# Patient Record
Sex: FEMALE | Race: WHITE | NOT HISPANIC OR LATINO | Employment: UNEMPLOYED | URBAN - METROPOLITAN AREA
[De-identification: names, ages, dates, MRNs, and addresses within clinical notes are randomized per-mention and may not be internally consistent; named-entity substitution may affect disease eponyms.]

---

## 2023-08-02 ENCOUNTER — APPOINTMENT (OUTPATIENT)
Dept: PRIMARY CARE | Facility: CLINIC | Age: 48
End: 2023-08-02
Payer: COMMERCIAL

## 2023-08-09 PROBLEM — J30.1 SEASONAL ALLERGIC RHINITIS DUE TO POLLEN: Status: ACTIVE | Noted: 2023-08-09

## 2023-08-09 PROBLEM — L40.4 PSORIASIS, GUTTATE: Status: ACTIVE | Noted: 2023-08-09

## 2023-08-09 RX ORDER — NYSTATIN 100000 [USP'U]/G
POWDER TOPICAL
COMMUNITY
Start: 2016-11-15 | End: 2023-08-10 | Stop reason: SDUPTHER

## 2023-08-09 RX ORDER — LEVONORGESTREL 52 MG/1
INTRAUTERINE DEVICE INTRAUTERINE
COMMUNITY

## 2023-08-09 RX ORDER — TRETINOIN 0.5 MG/G
CREAM TOPICAL
COMMUNITY
Start: 2021-05-24

## 2023-08-10 ENCOUNTER — LAB (OUTPATIENT)
Dept: LAB | Facility: LAB | Age: 48
End: 2023-08-10
Payer: COMMERCIAL

## 2023-08-10 ENCOUNTER — OFFICE VISIT (OUTPATIENT)
Dept: PRIMARY CARE | Facility: CLINIC | Age: 48
End: 2023-08-10
Payer: COMMERCIAL

## 2023-08-10 VITALS
OXYGEN SATURATION: 97 % | HEART RATE: 65 BPM | DIASTOLIC BLOOD PRESSURE: 83 MMHG | BODY MASS INDEX: 28.6 KG/M2 | WEIGHT: 182.2 LBS | HEIGHT: 67 IN | SYSTOLIC BLOOD PRESSURE: 132 MMHG

## 2023-08-10 DIAGNOSIS — L30.4 INTERTRIGO: ICD-10-CM

## 2023-08-10 DIAGNOSIS — J30.1 SEASONAL ALLERGIC RHINITIS DUE TO POLLEN: ICD-10-CM

## 2023-08-10 DIAGNOSIS — Z12.12 SCREENING FOR RECTAL CANCER: ICD-10-CM

## 2023-08-10 DIAGNOSIS — Z01.419 WELL WOMAN EXAM: ICD-10-CM

## 2023-08-10 DIAGNOSIS — Z00.00 HEALTHCARE MAINTENANCE: ICD-10-CM

## 2023-08-10 DIAGNOSIS — E03.9 HYPOTHYROIDISM, UNSPECIFIED TYPE: ICD-10-CM

## 2023-08-10 DIAGNOSIS — Z13.6 SCREENING FOR CARDIOVASCULAR CONDITION: ICD-10-CM

## 2023-08-10 DIAGNOSIS — Z00.00 HEALTHCARE MAINTENANCE: Primary | ICD-10-CM

## 2023-08-10 DIAGNOSIS — Z12.31 ENCOUNTER FOR SCREENING MAMMOGRAM FOR MALIGNANT NEOPLASM OF BREAST: ICD-10-CM

## 2023-08-10 DIAGNOSIS — Z12.11 SCREEN FOR COLON CANCER: ICD-10-CM

## 2023-08-10 PROBLEM — L40.4 PSORIASIS, GUTTATE: Status: RESOLVED | Noted: 2023-08-09 | Resolved: 2023-08-10

## 2023-08-10 LAB
ALANINE AMINOTRANSFERASE (SGPT) (U/L) IN SER/PLAS: 15 U/L (ref 7–45)
ALBUMIN (G/DL) IN SER/PLAS: 4.6 G/DL (ref 3.4–5)
ALKALINE PHOSPHATASE (U/L) IN SER/PLAS: 75 U/L (ref 33–110)
ANION GAP IN SER/PLAS: 11 MMOL/L (ref 10–20)
ASPARTATE AMINOTRANSFERASE (SGOT) (U/L) IN SER/PLAS: 17 U/L (ref 9–39)
BILIRUBIN TOTAL (MG/DL) IN SER/PLAS: 0.5 MG/DL (ref 0–1.2)
CALCIUM (MG/DL) IN SER/PLAS: 10.1 MG/DL (ref 8.6–10.6)
CARBON DIOXIDE, TOTAL (MMOL/L) IN SER/PLAS: 28 MMOL/L (ref 21–32)
CHLORIDE (MMOL/L) IN SER/PLAS: 108 MMOL/L (ref 98–107)
CHOLESTEROL (MG/DL) IN SER/PLAS: 212 MG/DL (ref 0–199)
CHOLESTEROL IN HDL (MG/DL) IN SER/PLAS: 94.8 MG/DL
CHOLESTEROL/HDL RATIO: 2.2
CREATININE (MG/DL) IN SER/PLAS: 0.92 MG/DL (ref 0.5–1.05)
ERYTHROCYTE DISTRIBUTION WIDTH (RATIO) BY AUTOMATED COUNT: 12.2 % (ref 11.5–14.5)
ERYTHROCYTE MEAN CORPUSCULAR HEMOGLOBIN CONCENTRATION (G/DL) BY AUTOMATED: 31.5 G/DL (ref 32–36)
ERYTHROCYTE MEAN CORPUSCULAR VOLUME (FL) BY AUTOMATED COUNT: 95 FL (ref 80–100)
ERYTHROCYTES (10*6/UL) IN BLOOD BY AUTOMATED COUNT: 4.32 X10E12/L (ref 4–5.2)
GFR FEMALE: 76 ML/MIN/1.73M2
GLUCOSE (MG/DL) IN SER/PLAS: 85 MG/DL (ref 74–99)
HEMATOCRIT (%) IN BLOOD BY AUTOMATED COUNT: 40.9 % (ref 36–46)
HEMOGLOBIN (G/DL) IN BLOOD: 12.9 G/DL (ref 12–16)
LDL: 98 MG/DL (ref 0–99)
LEUKOCYTES (10*3/UL) IN BLOOD BY AUTOMATED COUNT: 4.3 X10E9/L (ref 4.4–11.3)
NRBC (PER 100 WBCS) BY AUTOMATED COUNT: 0 /100 WBC (ref 0–0)
PLATELETS (10*3/UL) IN BLOOD AUTOMATED COUNT: 240 X10E9/L (ref 150–450)
POTASSIUM (MMOL/L) IN SER/PLAS: 4.8 MMOL/L (ref 3.5–5.3)
PROTEIN TOTAL: 7.1 G/DL (ref 6.4–8.2)
SODIUM (MMOL/L) IN SER/PLAS: 142 MMOL/L (ref 136–145)
THYROXINE (T4) FREE (NG/DL) IN SER/PLAS: 1.07 NG/DL (ref 0.78–1.48)
TRIGLYCERIDE (MG/DL) IN SER/PLAS: 94 MG/DL (ref 0–149)
UREA NITROGEN (MG/DL) IN SER/PLAS: 11 MG/DL (ref 6–23)
VLDL: 19 MG/DL (ref 0–40)

## 2023-08-10 PROCEDURE — 80061 LIPID PANEL: CPT

## 2023-08-10 PROCEDURE — 99396 PREV VISIT EST AGE 40-64: CPT | Performed by: INTERNAL MEDICINE

## 2023-08-10 PROCEDURE — 84439 ASSAY OF FREE THYROXINE: CPT

## 2023-08-10 PROCEDURE — 80053 COMPREHEN METABOLIC PANEL: CPT

## 2023-08-10 PROCEDURE — 36415 COLL VENOUS BLD VENIPUNCTURE: CPT

## 2023-08-10 PROCEDURE — 85027 COMPLETE CBC AUTOMATED: CPT

## 2023-08-10 PROCEDURE — 1036F TOBACCO NON-USER: CPT | Performed by: INTERNAL MEDICINE

## 2023-08-10 RX ORDER — NYSTATIN 100000 [USP'U]/G
POWDER TOPICAL
Qty: 9 G | Refills: 3 | Status: SHIPPED | OUTPATIENT
Start: 2023-08-10

## 2023-08-10 ASSESSMENT — LIFESTYLE VARIABLES
HOW OFTEN DO YOU HAVE A DRINK CONTAINING ALCOHOL: MONTHLY OR LESS
SKIP TO QUESTIONS 9-10: 0
HOW MANY STANDARD DRINKS CONTAINING ALCOHOL DO YOU HAVE ON A TYPICAL DAY: 1 OR 2
HOW OFTEN DO YOU HAVE SIX OR MORE DRINKS ON ONE OCCASION: LESS THAN MONTHLY
AUDIT-C TOTAL SCORE: 2

## 2023-08-10 ASSESSMENT — PATIENT HEALTH QUESTIONNAIRE - PHQ9
SUM OF ALL RESPONSES TO PHQ9 QUESTIONS 1 AND 2: 0
1. LITTLE INTEREST OR PLEASURE IN DOING THINGS: NOT AT ALL
2. FEELING DOWN, DEPRESSED OR HOPELESS: NOT AT ALL

## 2023-08-10 ASSESSMENT — ENCOUNTER SYMPTOMS
DEPRESSION: 0
LOSS OF SENSATION IN FEET: 0
OCCASIONAL FEELINGS OF UNSTEADINESS: 0
CONSTITUTIONAL NEGATIVE: 1

## 2023-08-10 NOTE — PROGRESS NOTES
"Subjective   Patient ID: Lina Johnson is a 48 y.o. female who presents for Annual Exam (PHYSICAL ).  HPI  Thinks going through menopause . Hot flashes.  Periods  noticed got better with diet cleanse.     Gyn  retired. Needs  refill.           Review of Systems   Constitutional: Negative.    All other systems reviewed and are negative.      Objective     BP Readings from Last 3 Encounters:   08/10/23 132/83   08/08/22 120/72   04/29/21 138/80      Wt Readings from Last 3 Encounters:   08/10/23 82.6 kg (182 lb 3.2 oz)   08/08/22 82.6 kg (182 lb)   04/29/21 80.7 kg (178 lb)      BMI: Estimated body mass index is 28.54 kg/m² as calculated from the following:    Height as of this encounter: 1.702 m (5' 7\").    Weight as of this encounter: 82.6 kg (182 lb 3.2 oz).    BSA: Estimated body surface area is 1.98 meters squared as calculated from the following:    Height as of this encounter: 1.702 m (5' 7\").    Weight as of this encounter: 82.6 kg (182 lb 3.2 oz).  Physical Exam  Vitals and nursing note reviewed.   Constitutional:       Appearance: Normal appearance.   HENT:      Head: Normocephalic and atraumatic.      Nose: Nose normal.   Eyes:      Conjunctiva/sclera: Conjunctivae normal.   Cardiovascular:      Rate and Rhythm: Normal rate and regular rhythm.   Pulmonary:      Effort: Pulmonary effort is normal.   Skin:     General: Skin is dry.   Neurological:      General: No focal deficit present.      Mental Status: She is alert and oriented to person, place, and time. Mental status is at baseline.   Psychiatric:         Mood and Affect: Mood normal.         Behavior: Behavior normal.         Assessment/Plan   Problem List Items Addressed This Visit          Medium    Seasonal allergic rhinitis due to pollen     Other Visit Diagnoses       Healthcare maintenance    -  Primary    Relevant Orders    Comprehensive Metabolic Panel    CBC    Lipid Panel    Thyroxine, Free    Screening for cardiovascular condition        " Relevant Orders    Lipid Panel    Hypothyroidism, unspecified type        Relevant Orders    Thyroxine, Free    Encounter for screening mammogram for malignant neoplasm of breast        Relevant Orders    BI mammo bilateral screening tomosynthesis    Well woman exam        Relevant Orders    Referral to Gynecology    Screen for colon cancer        Relevant Orders    Cologuard® colon cancer screening    Screening for rectal cancer        Relevant Orders    Cologuard® colon cancer screening    Intertrigo        Relevant Medications    nystatin (Mycostatin) 100,000 unit/gram powder

## 2023-08-28 ENCOUNTER — HOSPITAL ENCOUNTER (OUTPATIENT)
Dept: DATA CONVERSION | Facility: HOSPITAL | Age: 48
Discharge: HOME | End: 2023-08-28
Payer: COMMERCIAL

## 2023-08-28 DIAGNOSIS — Z12.31 ENCOUNTER FOR SCREENING MAMMOGRAM FOR MALIGNANT NEOPLASM OF BREAST: ICD-10-CM

## 2024-01-16 ENCOUNTER — TELEMEDICINE (OUTPATIENT)
Dept: PRIMARY CARE | Facility: CLINIC | Age: 49
End: 2024-01-16
Payer: COMMERCIAL

## 2024-01-16 DIAGNOSIS — B96.89 BACTERIAL SINUSITIS: Primary | ICD-10-CM

## 2024-01-16 DIAGNOSIS — J32.9 BACTERIAL SINUSITIS: Primary | ICD-10-CM

## 2024-01-16 PROCEDURE — 99212 OFFICE O/P EST SF 10 MIN: CPT | Performed by: NURSE PRACTITIONER

## 2024-01-16 RX ORDER — AZITHROMYCIN 250 MG/1
TABLET, FILM COATED ORAL
Qty: 6 TABLET | Refills: 0 | Status: SHIPPED | OUTPATIENT
Start: 2024-01-16 | End: 2024-01-21

## 2024-01-16 ASSESSMENT — ENCOUNTER SYMPTOMS
SINUS COMPLAINT: 1
HEADACHES: 1
SINUS PRESSURE: 1
CHILLS: 1
SHORTNESS OF BREATH: 0

## 2024-01-16 ASSESSMENT — LIFESTYLE VARIABLES: HISTORY_OF_SMOKING: I HAVE NEVER SMOKED

## 2024-01-16 NOTE — PATIENT INSTRUCTIONS
A prescription was sent to your pharmacy. Your pharmacist can answer any questions or concerns you may have or you may call the office.    Treatment  Tylenol for aches and pains, fever  Warm salt water gargles for throat discomfort  Lots of Fluids such as tea with honey, soup, broth, water, Electrolyte replacement drinks  Rest      Preventing Infection    Wash your hands. Wash your hands thoroughly and often with soap and water for at least 20 seconds. If soap and water aren't available, use an alcohol-based hand  that contains at least 60% alcohol. Teach your children the importance of hand-washing. Avoid touching your eyes, nose or mouth with unwashed hands.    Disinfect your stuff. Clean and disinfect high-touch surfaces, such as doorknobs, light switches, electronics, and kitchen and bathroom countertops daily. This is especially important when someone in your family has a cold. Wash children's toys periodically.    Cover your cough. Sneeze and cough into tissues. Throw away used tissues right away, then wash your hands thoroughly. If you don't have a tissue, sneeze or cough into the bend of your elbow and then wash your hands.    Don't share. Don't share drinking glasses or eating utensils with other family members. Use your own glass or disposable cups when you or someone else is sick. Label the cup or glass with the name of the person using it.    Stay away from people with colds. Avoid close contact with anyone who has a cold. Stay out of crowds, when possible. Avoid touching your eyes, nose and mouth.  Review your  center's policies. Look for a  setting with good hygiene practices and clear policies about keeping sick children at home.    Take care of yourself. Eating well and getting exercise and enough sleep is good for your overall health.

## 2024-01-16 NOTE — PROGRESS NOTES
Subjective   Patient ID: Lina Johnson is a 48 y.o. female who presents for a Virtual Visit bacterial sinus.  Sinus Problem  This is a new (Spouse came home from business trip with cold symptoms, treated with antibiotic, now spouse with symptoms) problem. The current episode started 1 to 4 weeks ago. The problem has been gradually worsening (worsening sinus pain, PMH bacterial sinus infections, feels same) since onset. There has been no fever. She is experiencing no pain. Associated symptoms include chills, congestion, ear pain, headaches and sinus pressure. Pertinent negatives include no shortness of breath. Past treatments include acetaminophen (honey, lemon). The treatment provided no relief.   Leaving Friday to go out of states ([place])  Home Covid test negative    Review of Systems   Constitutional:  Positive for chills.   HENT:  Positive for congestion, ear pain and sinus pressure.    Respiratory:  Negative for shortness of breath.    Neurological:  Positive for headaches.       Physical Exam not performed  E-visit questionnaire reviewed and discussed with patient.   All questions answered    Assessment/Plan   Problem List Items Addressed This Visit    None  Visit Diagnoses         Codes    Bacterial sinusitis    -  Primary J32.9, B96.89    Symptom management  Tylenol, Sudafed prn  Increase fluids     Relevant Medications    azithromycin (Zithromax) 250 mg tablet          Discussed with patient   Verbalized understanding, Teach back utilized  Recommend follow up with PCP in   week

## 2024-07-01 ENCOUNTER — TELEPHONE (OUTPATIENT)
Dept: PRIMARY CARE | Facility: CLINIC | Age: 49
End: 2024-07-01

## 2024-11-11 PROBLEM — J32.9 SINUSITIS: Status: ACTIVE | Noted: 2024-11-11

## 2024-11-11 PROBLEM — L94.0 LOCALIZED MORPHEA: Status: ACTIVE | Noted: 2024-11-11

## 2024-11-13 ENCOUNTER — OFFICE VISIT (OUTPATIENT)
Dept: PRIMARY CARE | Facility: CLINIC | Age: 49
End: 2024-11-13
Payer: COMMERCIAL

## 2024-11-13 ENCOUNTER — APPOINTMENT (OUTPATIENT)
Dept: PRIMARY CARE | Facility: CLINIC | Age: 49
End: 2024-11-13
Payer: COMMERCIAL

## 2024-11-13 VITALS
HEART RATE: 71 BPM | WEIGHT: 185 LBS | OXYGEN SATURATION: 99 % | DIASTOLIC BLOOD PRESSURE: 100 MMHG | SYSTOLIC BLOOD PRESSURE: 134 MMHG | HEIGHT: 67 IN | TEMPERATURE: 97 F | BODY MASS INDEX: 29.03 KG/M2

## 2024-11-13 DIAGNOSIS — Z00.00 HEALTH MAINTENANCE EXAMINATION: Primary | ICD-10-CM

## 2024-11-13 DIAGNOSIS — Z12.31 ENCOUNTER FOR SCREENING MAMMOGRAM FOR MALIGNANT NEOPLASM OF BREAST: ICD-10-CM

## 2024-11-13 DIAGNOSIS — R03.0 ELEVATED BLOOD PRESSURE READING WITHOUT DIAGNOSIS OF HYPERTENSION: ICD-10-CM

## 2024-11-13 DIAGNOSIS — Z12.11 SCREEN FOR COLON CANCER: ICD-10-CM

## 2024-11-13 PROBLEM — J32.9 SINUSITIS: Status: RESOLVED | Noted: 2024-11-11 | Resolved: 2024-11-13

## 2024-11-13 PROBLEM — C18.9 MALIGNANT NEOPLASM OF COLON: Status: RESOLVED | Noted: 2024-11-11 | Resolved: 2024-11-13

## 2024-11-13 LAB
ALBUMIN SERPL BCP-MCNC: 4.6 G/DL (ref 3.4–5)
ALP SERPL-CCNC: 73 U/L (ref 33–110)
ALT SERPL W P-5'-P-CCNC: 18 U/L (ref 7–45)
ANION GAP SERPL CALC-SCNC: 12 MMOL/L (ref 10–20)
AST SERPL W P-5'-P-CCNC: 22 U/L (ref 9–39)
BILIRUB SERPL-MCNC: 0.4 MG/DL (ref 0–1.2)
BUN SERPL-MCNC: 9 MG/DL (ref 6–23)
CALCIUM SERPL-MCNC: 9.4 MG/DL (ref 8.6–10.6)
CHLORIDE SERPL-SCNC: 108 MMOL/L (ref 98–107)
CHOLEST SERPL-MCNC: 248 MG/DL (ref 0–199)
CHOLESTEROL/HDL RATIO: 2.6
CO2 SERPL-SCNC: 25 MMOL/L (ref 21–32)
CREAT SERPL-MCNC: 0.8 MG/DL (ref 0.5–1.05)
EGFRCR SERPLBLD CKD-EPI 2021: 89 ML/MIN/1.73M*2
ERYTHROCYTE [DISTWIDTH] IN BLOOD BY AUTOMATED COUNT: 11.9 % (ref 11.5–14.5)
EST. AVERAGE GLUCOSE BLD GHB EST-MCNC: 100 MG/DL
GLUCOSE SERPL-MCNC: 96 MG/DL (ref 74–99)
HBA1C MFR BLD: 5.1 %
HCT VFR BLD AUTO: 43.8 % (ref 36–46)
HDLC SERPL-MCNC: 96.8 MG/DL
HGB BLD-MCNC: 14.2 G/DL (ref 12–16)
LDLC SERPL CALC-MCNC: 135 MG/DL
MCH RBC QN AUTO: 29.3 PG (ref 26–34)
MCHC RBC AUTO-ENTMCNC: 32.4 G/DL (ref 32–36)
MCV RBC AUTO: 91 FL (ref 80–100)
NON HDL CHOLESTEROL: 151 MG/DL (ref 0–149)
NRBC BLD-RTO: 0 /100 WBCS (ref 0–0)
PLATELET # BLD AUTO: 236 X10*3/UL (ref 150–450)
POTASSIUM SERPL-SCNC: 4.6 MMOL/L (ref 3.5–5.3)
PROT SERPL-MCNC: 7 G/DL (ref 6.4–8.2)
RBC # BLD AUTO: 4.84 X10*6/UL (ref 4–5.2)
SODIUM SERPL-SCNC: 140 MMOL/L (ref 136–145)
TRIGL SERPL-MCNC: 79 MG/DL (ref 0–149)
TSH SERPL-ACNC: 2.28 MIU/L (ref 0.44–3.98)
VLDL: 16 MG/DL (ref 0–40)
WBC # BLD AUTO: 4.6 X10*3/UL (ref 4.4–11.3)

## 2024-11-13 PROCEDURE — 80053 COMPREHEN METABOLIC PANEL: CPT

## 2024-11-13 PROCEDURE — 83036 HEMOGLOBIN GLYCOSYLATED A1C: CPT

## 2024-11-13 PROCEDURE — 84443 ASSAY THYROID STIM HORMONE: CPT

## 2024-11-13 PROCEDURE — 80061 LIPID PANEL: CPT

## 2024-11-13 PROCEDURE — 3008F BODY MASS INDEX DOCD: CPT | Performed by: FAMILY MEDICINE

## 2024-11-13 PROCEDURE — 99396 PREV VISIT EST AGE 40-64: CPT | Performed by: FAMILY MEDICINE

## 2024-11-13 PROCEDURE — 85027 COMPLETE CBC AUTOMATED: CPT

## 2024-11-13 PROCEDURE — 1036F TOBACCO NON-USER: CPT | Performed by: FAMILY MEDICINE

## 2024-11-13 ASSESSMENT — PATIENT HEALTH QUESTIONNAIRE - PHQ9
2. FEELING DOWN, DEPRESSED OR HOPELESS: NOT AT ALL
2. FEELING DOWN, DEPRESSED OR HOPELESS: NOT AT ALL
SUM OF ALL RESPONSES TO PHQ9 QUESTIONS 1 AND 2: 0
1. LITTLE INTEREST OR PLEASURE IN DOING THINGS: NOT AT ALL
SUM OF ALL RESPONSES TO PHQ9 QUESTIONS 1 AND 2: 0
1. LITTLE INTEREST OR PLEASURE IN DOING THINGS: NOT AT ALL

## 2024-11-13 ASSESSMENT — ENCOUNTER SYMPTOMS
OCCASIONAL FEELINGS OF UNSTEADINESS: 0
LOSS OF SENSATION IN FEET: 0
DEPRESSION: 0

## 2024-11-13 NOTE — ASSESSMENT & PLAN NOTE
Recommended screening guidelines addressed and orders placed as indicated by age and chronic conditions  Screening labs ordered, will call with results  Mammogram ordered, cologuard  Continue to work on healthy lifestyle including well balanced diet, regular activity, limit alcohol, no tobacco products   Follow up annually

## 2024-11-13 NOTE — ASSESSMENT & PLAN NOTE
Lifestyle modification discussed at length with focus on improving diet and increasing activity levels  We did discuss medication use to help with metabolic changes post menopause  Does have elevated blood pressure in office and we'll keep an eye on this as it is a comorbidity

## 2024-11-13 NOTE — ASSESSMENT & PLAN NOTE
Monitor blood pressure closely  Previously normal therefore unclear if this is situational or related to some of the recent weight and metabolic changes

## 2024-11-13 NOTE — PROGRESS NOTES
Reason for Visit: Annual Physical Exam    HPI:  Presents to establish and discuss menopause and weight gain  Frustrated with some of the central weight that she has gained, interested in considering GLP1    Active Problem List  Patient Active Problem List   Diagnosis    Allergic rhinitis due to pollen    Localized morphea    Melanocytic nevi of trunk    Melanocytic nevi of unspecified lower limb, including hip    Melanocytic nevi of unspecified upper limb, including shoulder    Melanocytic nevi of other parts of face    Skin changes due to chronic exposure to nonionizing radiation, unspecified    Health maintenance examination    Elevated blood pressure reading without diagnosis of hypertension    BMI 28.0-28.9,adult       Comprehensive Medical/Surgical/Social/Family History  Past Medical History:   Diagnosis Date    Acute bronchitis, unspecified 01/16/2017    Acute tracheobronchitis    Acute bronchitis, unspecified 01/17/2018    Acute bronchitis due to infection    Acute maxillary sinusitis, unspecified 06/04/2019    Acute maxillary sinusitis, unspecified    Acute pharyngitis, unspecified 05/25/2016    Sore throat    Acute sinusitis, unspecified 06/05/2018    Acute non-recurrent sinusitis, unspecified location    Candidiasis of skin and nail 11/15/2016    Candidal intertrigo    Cough, unspecified 11/15/2016    Coughing    Decreased white blood cell count, unspecified 05/25/2016    Leukopenia, unspecified type    Displaced fracture of distal phalanx of left little finger, initial encounter for closed fracture 11/02/2015    Closed displaced fracture of distal phalanx of left little finger, initial encounter    Encounter for health counseling related to travel 06/05/2018    Counseling about travel    Encounter for immunization 10/08/2020    Influenza vaccination administered at current visit    Encounter for screening for infectious and parasitic diseases, unspecified 06/05/2018    Encounter for screening for  infectious or parasitic diseases    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, guttate    Other specified cough 05/25/2016    Productive cough    Otitis media, unspecified, bilateral 06/05/2018    Acute bilateral otitis media    Personal history of other (healed) physical injury and trauma 03/25/2021    History of injury    Personal history of other diseases of the respiratory system 03/22/2022    History of sinusitis    Personal history of other diseases of the respiratory system 05/25/2016    History of acute sinusitis    Personal history of other drug therapy 11/15/2016    History of influenza vaccination    Personal history of other infectious and parasitic diseases 05/25/2016    History of viral infection    Personal history of other specified conditions 11/15/2016    History of weight gain    Personal history of other specified conditions 12/11/2018    History of motion sickness    Sprain of other specified parts of left knee, initial encounter 04/27/2021    Sprain of other ligament of left knee, initial encounter    Sprain of unspecified site of left knee, subsequent encounter 04/27/2021    Sprain of left knee, unspecified ligament, subsequent encounter    Torticollis 11/15/2016    Acute muscle stiffness of neck    Unspecified acute conjunctivitis, unspecified eye 06/26/2020    Acute bacterial conjunctivitis, unspecified laterality    Whooping cough, unspecified species without pneumonia 01/16/2017    Pertussis-like syndrome     History reviewed. No pertinent surgical history.  Social History     Tobacco Use    Smoking status: Never    Smokeless tobacco: Never   Substance Use Topics    Alcohol use: Yes     Comment: social    Drug use: Never     No family history on file.      Allergies and Medications  Patient has no known allergies.  Current Outpatient Medications on File Prior to Visit   Medication Sig Dispense Refill    Lactobacillus acidophilus (PROBIOTIC ORAL) Take by mouth.      tretinoin (Retin-A) 0.05  "% cream APPLY PEA SIZED AMOUNT TO FULL FACE AT BEDTIME      nystatin (Mycostatin) 100,000 unit/gram powder apply to affected area bid as directed (Patient not taking: Reported on 11/13/2024) 9 g 3    [DISCONTINUED] levonorgestrel (Mirena) 21 mcg/24 hours (8 yrs) 52 mg IUD by intrauterine route.       No current facility-administered medications on file prior to visit.         ROS otherwise negative aside from what was mentioned above in HPI.    Vitals  BP (!) 134/100 (BP Location: Left arm, Patient Position: Sitting, BP Cuff Size: Adult)   Pulse 71   Temp 36.1 °C (97 °F) (Temporal)   Ht 1.702 m (5' 7\")   Wt 83.9 kg (185 lb)   SpO2 99%   BMI 28.98 kg/m²   Body mass index is 28.98 kg/m².  Physical Exam  Gen: Alert, NAD  HEENT:  PERRL, EOMI, conjunctiva and sclera normal in appearance. External auditory canals/TMs normal; Oral cavity and posterior pharynx without lesions/exudate  Neck:  Supple with FROM; No masses/nodes palpable; Thyroid nontender and without nodules; No NAOMIE  Respiratory:  Lungs CTAB  Cardiovascular:  Heart RRR. No M/R/G. Peripheral pulses equal bilaterally  Abdomen:  Soft, nontender, No R/G/R; No HSM or masses palpated  Extremities:  FROM all extremities; Muscle strength grossly normal with good tone  Neuro:  CN II-XII intact; Gross motor and sensory intact  Skin:  No suspicious lesions present    Assessment and Plan:  Problem List Items Addressed This Visit       Health maintenance examination - Primary    Current Assessment & Plan     Recommended screening guidelines addressed and orders placed as indicated by age and chronic conditions  Screening labs ordered, will call with results  Mammogram ordered, cologuard  Continue to work on healthy lifestyle including well balanced diet, regular activity, limit alcohol, no tobacco products   Follow up annually           Relevant Orders    TSH with reflex to Free T4 if abnormal    Hemoglobin A1C    Comprehensive Metabolic Panel    CBC    Lipid Panel "    Referral to Gynecology    Elevated blood pressure reading without diagnosis of hypertension    Current Assessment & Plan     Monitor blood pressure closely  Previously normal therefore unclear if this is situational or related to some of the recent weight and metabolic changes         BMI 28.0-28.9,adult    Current Assessment & Plan     Lifestyle modification discussed at length with focus on improving diet and increasing activity levels  We did discuss medication use to help with metabolic changes post menopause  Does have elevated blood pressure in office and we'll keep an eye on this as it is a comorbidity          Other Visit Diagnoses       Encounter for screening mammogram for malignant neoplasm of breast        Relevant Orders    BI mammo bilateral screening tomosynthesis    Screen for colon cancer        Relevant Orders    Cologuard® colon cancer screening              Fara Conway MD

## 2024-11-14 DIAGNOSIS — E78.00 ELEVATED LDL CHOLESTEROL LEVEL: ICD-10-CM

## 2024-11-14 DIAGNOSIS — R03.0 ELEVATED BLOOD PRESSURE READING WITHOUT DIAGNOSIS OF HYPERTENSION: ICD-10-CM

## 2024-11-18 ENCOUNTER — TELEPHONE (OUTPATIENT)
Dept: PRIMARY CARE | Facility: CLINIC | Age: 49
End: 2024-11-18
Payer: COMMERCIAL

## 2024-11-18 NOTE — TELEPHONE ENCOUNTER
Pt LVM on Friday asking about her zepbound prescription, the pharmacy told her she needed to contact our office about this. PA was started through CoverMyMeds and was denied. Sent a message to pharmacy to see if there are any options for an appeal. Waiting on response from them. Called pt and updated her on what is going on. Verbalized understanding.

## 2024-11-19 ENCOUNTER — TELEPHONE (OUTPATIENT)
Dept: PRIMARY CARE | Facility: CLINIC | Age: 49
End: 2024-11-19
Payer: COMMERCIAL

## 2024-11-19 NOTE — TELEPHONE ENCOUNTER
----- Message from Evon Valdez sent at 11/19/2024  2:09 PM EST -----  Regarding: RE: Lab results  Unfortunately no, looks like a plan exclusion and she doesn't meet any criteria for an appeal. I spoke with the patient to see if she might qualify for any cost assistance options and she does not. She plans to start Zepbound using the  copay card.  Thanks,  Gaby Valdez, PharmD   [phone]  ----- Message -----  From: Taylor Hobson  Sent: 11/15/2024   8:24 AM EST  To: Evon Valdez, PharmD  Subject: FW: Lab results                                  Pt was prescribed Zepbound, went through CoverMyMeds and it was denied. Is there anything we can do for this?  ----- Message -----  From: Fara Conway MD  Sent: 11/14/2024  12:11 PM EST  To: Taylor Hobson  Subject: RE: Lab results                                  Sent to her pharmacy, we'll see if covered.  Jl  ----- Message -----  From: Taylor Hobson  Sent: 11/14/2024  11:12 AM EST  To: Fara Conway MD  Subject: Lab results                                      Pt called in stating she saw the notes on her lab results and wants to know how to go about starting the injection you guys discussed yesterday. She wants to know what you suggest for her next steps. Please advise.

## 2024-12-16 ENCOUNTER — HOSPITAL ENCOUNTER (OUTPATIENT)
Dept: RADIOLOGY | Facility: HOSPITAL | Age: 49
Discharge: HOME | End: 2024-12-16
Payer: COMMERCIAL

## 2024-12-16 DIAGNOSIS — Z12.31 ENCOUNTER FOR SCREENING MAMMOGRAM FOR MALIGNANT NEOPLASM OF BREAST: ICD-10-CM

## 2024-12-16 PROCEDURE — 77067 SCR MAMMO BI INCL CAD: CPT | Performed by: RADIOLOGY

## 2024-12-16 PROCEDURE — 77063 BREAST TOMOSYNTHESIS BI: CPT

## 2024-12-16 PROCEDURE — 77063 BREAST TOMOSYNTHESIS BI: CPT | Performed by: RADIOLOGY

## 2024-12-19 ENCOUNTER — APPOINTMENT (OUTPATIENT)
Dept: OBSTETRICS AND GYNECOLOGY | Facility: CLINIC | Age: 49
End: 2024-12-19
Payer: COMMERCIAL

## 2025-01-09 ENCOUNTER — APPOINTMENT (OUTPATIENT)
Dept: OBSTETRICS AND GYNECOLOGY | Facility: CLINIC | Age: 50
End: 2025-01-09
Payer: COMMERCIAL

## 2025-01-13 ENCOUNTER — TELEPHONE (OUTPATIENT)
Dept: PRIMARY CARE | Facility: CLINIC | Age: 50
End: 2025-01-13
Payer: COMMERCIAL

## 2025-01-13 DIAGNOSIS — R03.0 ELEVATED BLOOD PRESSURE READING WITHOUT DIAGNOSIS OF HYPERTENSION: ICD-10-CM

## 2025-01-13 DIAGNOSIS — E78.00 ELEVATED LDL CHOLESTEROL LEVEL: ICD-10-CM

## 2025-01-13 NOTE — TELEPHONE ENCOUNTER
Lina called stating [facility] is awaiting authorization for refill on her Zepbound, she's due to take next dosage tomorrow evening

## 2025-01-14 DIAGNOSIS — R03.0 ELEVATED BLOOD PRESSURE READING WITHOUT DIAGNOSIS OF HYPERTENSION: ICD-10-CM

## 2025-02-12 ENCOUNTER — APPOINTMENT (OUTPATIENT)
Dept: PRIMARY CARE | Facility: CLINIC | Age: 50
End: 2025-02-12
Payer: COMMERCIAL

## 2025-02-12 VITALS
WEIGHT: 164 LBS | HEART RATE: 77 BPM | BODY MASS INDEX: 25.74 KG/M2 | SYSTOLIC BLOOD PRESSURE: 142 MMHG | HEIGHT: 67 IN | OXYGEN SATURATION: 98 % | TEMPERATURE: 96.6 F | DIASTOLIC BLOOD PRESSURE: 90 MMHG

## 2025-02-12 DIAGNOSIS — R03.0 ELEVATED BLOOD PRESSURE READING WITHOUT DIAGNOSIS OF HYPERTENSION: ICD-10-CM

## 2025-02-12 DIAGNOSIS — Z23 NEED FOR TDAP VACCINATION: Primary | ICD-10-CM

## 2025-02-12 DIAGNOSIS — E78.2 MIXED HYPERLIPIDEMIA: ICD-10-CM

## 2025-02-12 PROCEDURE — 90715 TDAP VACCINE 7 YRS/> IM: CPT | Performed by: FAMILY MEDICINE

## 2025-02-12 PROCEDURE — 90471 IMMUNIZATION ADMIN: CPT | Performed by: FAMILY MEDICINE

## 2025-02-12 PROCEDURE — 1036F TOBACCO NON-USER: CPT | Performed by: FAMILY MEDICINE

## 2025-02-12 PROCEDURE — 99214 OFFICE O/P EST MOD 30 MIN: CPT | Performed by: FAMILY MEDICINE

## 2025-02-12 PROCEDURE — 3008F BODY MASS INDEX DOCD: CPT | Performed by: FAMILY MEDICINE

## 2025-02-12 RX ORDER — BISMUTH SUBSALICYLATE 262 MG
1 TABLET,CHEWABLE ORAL DAILY
COMMUNITY

## 2025-02-12 ASSESSMENT — ENCOUNTER SYMPTOMS
LOSS OF SENSATION IN FEET: 0
OCCASIONAL FEELINGS OF UNSTEADINESS: 0
DEPRESSION: 0

## 2025-02-12 ASSESSMENT — PATIENT HEALTH QUESTIONNAIRE - PHQ9
SUM OF ALL RESPONSES TO PHQ9 QUESTIONS 1 AND 2: 0
2. FEELING DOWN, DEPRESSED OR HOPELESS: NOT AT ALL
1. LITTLE INTEREST OR PLEASURE IN DOING THINGS: NOT AT ALL

## 2025-02-12 NOTE — PROGRESS NOTES
"Subjective   Patient ID: Lina Johnson is a 49 y.o. female who presents for Follow-up, Elevated BP w/o dx HTN, and BMI.    HPI   Has been on Zepbound for 3 months.  Doing well overall.  Down over 20lbs and feels great. No adverse reaction.      Home BP are normal and has been checking routinely.  Review of Systems  Negative unless noted in HPI    Objective   /90 (BP Location: Left arm, Patient Position: Sitting, BP Cuff Size: Adult)   Pulse 77   Temp 35.9 °C (96.6 °F) (Temporal)   Ht 1.702 m (5' 7\")   Wt 74.4 kg (164 lb)   SpO2 98%   BMI 25.69 kg/m²     Physical Exam  Constitutional:       Appearance: She is normal weight.   Eyes:      Extraocular Movements: Extraocular movements intact.      Pupils: Pupils are equal, round, and reactive to light.   Cardiovascular:      Rate and Rhythm: Normal rate and regular rhythm.      Heart sounds: No murmur heard.     No gallop.   Pulmonary:      Effort: Pulmonary effort is normal.      Breath sounds: Normal breath sounds.   Musculoskeletal:      Cervical back: Normal range of motion.   Neurological:      General: No focal deficit present.      Mental Status: She is alert and oriented to person, place, and time.         Assessment/Plan          "

## 2025-02-12 NOTE — ASSESSMENT & PLAN NOTE
Significant improvement in weight and doing well with Zepbound  Will continue on medication and increase dose to 7.5mg with next refill  Recheck lipids in 3 months

## 2025-02-26 ENCOUNTER — PATIENT MESSAGE (OUTPATIENT)
Dept: PRIMARY CARE | Facility: CLINIC | Age: 50
End: 2025-02-26
Payer: COMMERCIAL

## 2025-02-26 DIAGNOSIS — J01.10 ACUTE NON-RECURRENT FRONTAL SINUSITIS: Primary | ICD-10-CM

## 2025-02-26 RX ORDER — AMOXICILLIN AND CLAVULANATE POTASSIUM 875; 125 MG/1; MG/1
1 TABLET, FILM COATED ORAL 2 TIMES DAILY
Qty: 14 TABLET | Refills: 0 | Status: SHIPPED | OUTPATIENT
Start: 2025-02-26 | End: 2025-03-05

## 2025-03-06 DIAGNOSIS — R03.0 ELEVATED BLOOD PRESSURE READING WITHOUT DIAGNOSIS OF HYPERTENSION: Primary | ICD-10-CM

## 2025-03-06 DIAGNOSIS — Z76.89 ENCOUNTER FOR WEIGHT MANAGEMENT: ICD-10-CM

## 2025-03-14 ENCOUNTER — APPOINTMENT (OUTPATIENT)
Dept: OBSTETRICS AND GYNECOLOGY | Facility: CLINIC | Age: 50
End: 2025-03-14
Payer: COMMERCIAL

## 2025-03-14 VITALS
WEIGHT: 158.6 LBS | HEIGHT: 67 IN | BODY MASS INDEX: 24.89 KG/M2 | SYSTOLIC BLOOD PRESSURE: 114 MMHG | DIASTOLIC BLOOD PRESSURE: 80 MMHG

## 2025-03-14 DIAGNOSIS — Z12.4 CERVICAL CANCER SCREENING: ICD-10-CM

## 2025-03-14 DIAGNOSIS — L90.0 LICHEN SCLEROSUS: ICD-10-CM

## 2025-03-14 DIAGNOSIS — N95.1 MENOPAUSAL SYNDROME ON HORMONE REPLACEMENT THERAPY: ICD-10-CM

## 2025-03-14 DIAGNOSIS — Z01.419 WELL WOMAN EXAM WITH ROUTINE GYNECOLOGICAL EXAM: Primary | ICD-10-CM

## 2025-03-14 DIAGNOSIS — Z79.890 MENOPAUSAL SYNDROME ON HORMONE REPLACEMENT THERAPY: ICD-10-CM

## 2025-03-14 DIAGNOSIS — Z00.00 HEALTH MAINTENANCE EXAMINATION: ICD-10-CM

## 2025-03-14 PROCEDURE — 3008F BODY MASS INDEX DOCD: CPT | Performed by: NURSE PRACTITIONER

## 2025-03-14 PROCEDURE — 88175 CYTOPATH C/V AUTO FLUID REDO: CPT

## 2025-03-14 PROCEDURE — 1036F TOBACCO NON-USER: CPT | Performed by: NURSE PRACTITIONER

## 2025-03-14 PROCEDURE — 87624 HPV HI-RISK TYP POOLED RSLT: CPT

## 2025-03-14 PROCEDURE — 99386 PREV VISIT NEW AGE 40-64: CPT | Performed by: NURSE PRACTITIONER

## 2025-03-14 RX ORDER — CLOBETASOL PROPIONATE 0.5 MG/G
OINTMENT TOPICAL 2 TIMES DAILY
Qty: 45 G | Refills: 0 | Status: SHIPPED | OUTPATIENT
Start: 2025-03-14

## 2025-03-14 RX ORDER — PROGESTERONE 100 MG/1
100 CAPSULE ORAL DAILY
Qty: 30 CAPSULE | Refills: 11 | Status: SHIPPED | OUTPATIENT
Start: 2025-03-14

## 2025-03-14 RX ORDER — ESTRADIOL 0.04 MG/D
1 FILM, EXTENDED RELEASE TRANSDERMAL 2 TIMES WEEKLY
Qty: 8 PATCH | Refills: 11 | Status: SHIPPED | OUTPATIENT
Start: 2025-03-17 | End: 2026-03-17

## 2025-03-14 ASSESSMENT — PAIN SCALES - GENERAL: PAINLEVEL_OUTOF10: 0-NO PAIN

## 2025-03-14 ASSESSMENT — ENCOUNTER SYMPTOMS
ENDOCRINE NEGATIVE: 0
EYES NEGATIVE: 0
CONSTITUTIONAL NEGATIVE: 0
CARDIOVASCULAR NEGATIVE: 0
ALLERGIC/IMMUNOLOGIC NEGATIVE: 0
MUSCULOSKELETAL NEGATIVE: 0
GASTROINTESTINAL NEGATIVE: 0
NEUROLOGICAL NEGATIVE: 0
HEMATOLOGIC/LYMPHATIC NEGATIVE: 0
RESPIRATORY NEGATIVE: 0
PSYCHIATRIC NEGATIVE: 0

## 2025-03-14 ASSESSMENT — PATIENT HEALTH QUESTIONNAIRE - PHQ9
SUM OF ALL RESPONSES TO PHQ9 QUESTIONS 1 & 2: 0
2. FEELING DOWN, DEPRESSED OR HOPELESS: NOT AT ALL
1. LITTLE INTEREST OR PLEASURE IN DOING THINGS: NOT AT ALL

## 2025-03-14 NOTE — LETTER
March 14, 2025     Fara Conway MD  [address]  [address]  [facility] [address]    Patient: Lina Johnson   YOB: 1975   Date of Visit: 3/14/2025       Dear Dr. Fara Conway MD:    Thank you for referring Lina Johnson to me for evaluation. Below are my notes for this consultation.  If you have questions, please do not hesitate to call me. I look forward to following your patient along with you.       Sincerely,     Wellington Sullivan, APRN-CNP      CC: No Recipients  ______________________________________________________________________________________    Subjective  Patient ID: Lina Johnson is a 50 y.o. female who presents for Annual Exam (Pap 2022/) and Menopause.  HPI  Pt presents for annual exam  Concerns:     Perimenopausal  LMP 10/2024  Contraception:  vasectomy    History of a DVT/PE: none  History of HTN: none  History of elevated lipids: yes  Tobacco use: none  Personal history of breast cancer: none    History of HT: none  History of compounded bioidentical: none  History of OTC treatments for menopausal symptoms: none  History of prescription, non-hormonal medications for menopausal symptoms: none    Postmenopausal bleeding: n/a  Mood changes: yes  Sleep problems: yes  VMS: yes  Joint pain: none  Brain fog/difficulty concentrating: occasional  Weight gain, on zepbound which has been effective    GSM: none  are you sexually active?: yes  Having sex with men, women, or both/other:   do you have any loss in desire?: none  do you have any pain with intercourse?: none  are you able to have an orgasm?:  yes     Vaginal hygiene: soap-oil of olay  Urinary incontinence: none    H/O of STI: none  requesting sti testing?: none  H/O abnormal pap: none    Lives with: , daughter  Employment: schedules medical student rotations  exercise: yes    Calcium intake: adequate  Vitamin D intake:adequate  Increased risk of osteoporosis: none  Fracture since menopause: none    FH of breast cancer: none  FH of  ovarian cancer: none  FH of colon cancer: none  FH pancreatic cancer:  none  Review of Systems    Objective  Physical Exam  Vitals and nursing note reviewed.   Constitutional:       Appearance: Normal appearance.   Cardiovascular:      Rate and Rhythm: Normal rate and regular rhythm.      Heart sounds: Normal heart sounds.   Pulmonary:      Effort: Pulmonary effort is normal.      Breath sounds: Normal breath sounds.   Chest:   Breasts:     Right: Normal.      Left: Normal.   Abdominal:      Tenderness: There is no abdominal tenderness.   Genitourinary:     Exam position: Lithotomy position.      Labia:         Right: No lesion.         Left: No lesion.       Vagina: Normal.      Cervix: Normal.          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   Neurological:      Mental Status: She is alert.   Psychiatric:         Attention and Perception: Attention normal.         Mood and Affect: Mood normal.         Speech: Speech normal.         Behavior: Behavior normal.         Thought Content: Thought content normal.         Cognition and Memory: Cognition and memory normal.         Judgment: Judgment normal.         Assessment/Plan  Diagnoses and all orders for this visit:  Well woman exam with routine gynecological exam  Health maintenance examination  -     Referral to Gynecology  Menopausal syndrome on hormone replacement therapy  -     progesterone (Prometrium) 100 mg capsule; Take 1 capsule (100 mg) by mouth once daily. Take at bedtime  -     estradiol (Vivelle-DOT) 0.0375 mg/24 hr; Place 1 patch over 96 hours on the skin 2 times a week.  Lichen sclerosus  -     clobetasol (Temovate) 0.05 % ointment; Apply topically 2 times a day.  Cervical cancer screening  -     THINPREP PAP TEST       LS diagnosed per exam, clobetasol prescribed  S/s perimenopause  discussed, MHT prescribed; transdermal d/t elevated lipids    Follow up in 8 weeks    Wellington Sullivan, AMARIS-CNP 03/14/25 9:18 AM

## 2025-03-14 NOTE — PROGRESS NOTES
Subjective   Patient ID: Lina Johnson is a 50 y.o. female who presents for Annual Exam (Pap 2022/) and Menopause.  HPI  Pt presents for annual exam  Concerns:     Perimenopausal  LMP 10/2024  Contraception:  vasectomy    History of a DVT/PE: none  History of HTN: none  History of elevated lipids: yes  Tobacco use: none  Personal history of breast cancer: none    History of HT: none  History of compounded bioidentical: none  History of OTC treatments for menopausal symptoms: none  History of prescription, non-hormonal medications for menopausal symptoms: none    Postmenopausal bleeding: n/a  Mood changes: yes  Sleep problems: yes  VMS: yes  Joint pain: none  Brain fog/difficulty concentrating: occasional  Weight gain, on zepbound which has been effective    GSM: none  are you sexually active?: yes  Having sex with men, women, or both/other:   do you have any loss in desire?: none  do you have any pain with intercourse?: none  are you able to have an orgasm?:  yes     Vaginal hygiene: soap-oil of olay  Urinary incontinence: none    H/O of STI: none  requesting sti testing?: none  H/O abnormal pap: none    Lives with: , daughter  Employment: schedules medical student rotations  exercise: yes    Calcium intake: adequate  Vitamin D intake:adequate  Increased risk of osteoporosis: none  Fracture since menopause: none    FH of breast cancer: none  FH of ovarian cancer: none  FH of colon cancer: none  FH pancreatic cancer:  none  Review of Systems    Objective   Physical Exam  Vitals and nursing note reviewed.   Constitutional:       Appearance: Normal appearance.   Cardiovascular:      Rate and Rhythm: Normal rate and regular rhythm.      Heart sounds: Normal heart sounds.   Pulmonary:      Effort: Pulmonary effort is normal.      Breath sounds: Normal breath sounds.   Chest:   Breasts:     Right: Normal.      Left: Normal.   Abdominal:      Tenderness: There is no abdominal tenderness.   Genitourinary:      Exam position: Lithotomy position.      Labia:         Right: No lesion.         Left: No lesion.       Vagina: Normal.      Cervix: Normal.          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   Neurological:      Mental Status: She is alert.   Psychiatric:         Attention and Perception: Attention normal.         Mood and Affect: Mood normal.         Speech: Speech normal.         Behavior: Behavior normal.         Thought Content: Thought content normal.         Cognition and Memory: Cognition and memory normal.         Judgment: Judgment normal.         Assessment/Plan   Diagnoses and all orders for this visit:  Well woman exam with routine gynecological exam  Health maintenance examination  -     Referral to Gynecology  Menopausal syndrome on hormone replacement therapy  -     progesterone (Prometrium) 100 mg capsule; Take 1 capsule (100 mg) by mouth once daily. Take at bedtime  -     estradiol (Vivelle-DOT) 0.0375 mg/24 hr; Place 1 patch over 96 hours on the skin 2 times a week.  Lichen sclerosus  -     clobetasol (Temovate) 0.05 % ointment; Apply topically 2 times a day.  Cervical cancer screening  -     THINPREP PAP TEST       LS diagnosed per exam, clobetasol prescribed  S/s perimenopause discussed, MHT prescribed; transdermal d/t elevated lipids    Follow up in 8 weeks    AMARIS Bean-CNP 03/14/25 9:18 AM

## 2025-03-27 LAB
CYTOLOGY CMNT CVX/VAG CYTO-IMP: NORMAL
HPV HR 12 DNA GENITAL QL NAA+PROBE: NEGATIVE
HPV HR GENOTYPES PNL CVX NAA+PROBE: NEGATIVE
HPV16 DNA SPEC QL NAA+PROBE: NEGATIVE
HPV18 DNA SPEC QL NAA+PROBE: NEGATIVE
LAB AP HPV GENOTYPE QUESTION: YES
LAB AP HPV HR: NORMAL
LABORATORY COMMENT REPORT: NORMAL
PATH REPORT.TOTAL CANCER: NORMAL

## 2025-03-28 ENCOUNTER — TELEPHONE (OUTPATIENT)
Dept: OBSTETRICS AND GYNECOLOGY | Facility: CLINIC | Age: 50
End: 2025-03-28
Payer: COMMERCIAL

## 2025-04-01 DIAGNOSIS — L90.0 LICHEN SCLEROSUS: ICD-10-CM

## 2025-04-01 RX ORDER — CLOBETASOL PROPIONATE 0.5 MG/G
OINTMENT TOPICAL 2 TIMES DAILY
Qty: 45 G | Refills: 0 | Status: SHIPPED | OUTPATIENT
Start: 2025-04-01

## 2025-04-03 ENCOUNTER — DOCUMENTATION (OUTPATIENT)
Dept: OBSTETRICS AND GYNECOLOGY | Facility: CLINIC | Age: 50
End: 2025-04-03
Payer: COMMERCIAL

## 2025-05-21 ENCOUNTER — OFFICE VISIT (OUTPATIENT)
Dept: PRIMARY CARE | Facility: CLINIC | Age: 50
End: 2025-05-21
Payer: COMMERCIAL

## 2025-05-21 VITALS
HEIGHT: 67 IN | TEMPERATURE: 97.2 F | SYSTOLIC BLOOD PRESSURE: 140 MMHG | DIASTOLIC BLOOD PRESSURE: 90 MMHG | OXYGEN SATURATION: 98 % | WEIGHT: 149.8 LBS | BODY MASS INDEX: 23.51 KG/M2 | HEART RATE: 78 BPM

## 2025-05-21 DIAGNOSIS — M79.89 NODULE OF SOFT TISSUE: Primary | ICD-10-CM

## 2025-05-21 PROCEDURE — 1036F TOBACCO NON-USER: CPT | Performed by: FAMILY MEDICINE

## 2025-05-21 PROCEDURE — 99213 OFFICE O/P EST LOW 20 MIN: CPT | Performed by: FAMILY MEDICINE

## 2025-05-21 PROCEDURE — 3008F BODY MASS INDEX DOCD: CPT | Performed by: FAMILY MEDICINE

## 2025-05-21 ASSESSMENT — PATIENT HEALTH QUESTIONNAIRE - PHQ9
2. FEELING DOWN, DEPRESSED OR HOPELESS: NOT AT ALL
1. LITTLE INTEREST OR PLEASURE IN DOING THINGS: NOT AT ALL
SUM OF ALL RESPONSES TO PHQ9 QUESTIONS 1 AND 2: 0

## 2025-05-21 NOTE — PROGRESS NOTES
"Subjective   Patient ID: Lina Johnson is a 50 y.o. female who presents for Mass (Has had ache last few weeks, feels a lump in lower back).    HPI   Lump on lower back for last 2 weeks, painful in area but not sure if because of exercise. Not sure if this is new, has a lost a lot of weight recently therefore some physical changes.  Review of Systems  Negative unless noted in HPI    Objective   /90 (BP Location: Left arm, Patient Position: Sitting, BP Cuff Size: Adult)   Pulse 78   Temp 36.2 °C (97.2 °F) (Temporal)   Ht 1.702 m (5' 7\")   Wt 67.9 kg (149 lb 12.8 oz)   SpO2 98%   BMI 23.46 kg/m²     Physical Exam  Musculoskeletal:      Comments: Lateral to lower lumbar spine there is a small (<1cm) soft, somewhat mobile nodule, regular borders, no overlying skin abnormality, fairly superficial         Assessment/Plan   Problem List Items Addressed This Visit           ICD-10-CM    Nodule of soft tissue - Primary M79.89    Will start with ultrasound of the lower back area to assess/confirm if lipoma  If not conclusive then pursue either xray or MRI depending on results/symptoms         Relevant Orders    US nonvascular extremity US extremity nonvascular real time w image documentation complete          "

## 2025-05-21 NOTE — ASSESSMENT & PLAN NOTE
Will start with ultrasound of the lower back area to assess/confirm if lipoma  If not conclusive then pursue either xray or MRI depending on results/symptoms  
DISPLAY PLAN FREE TEXT

## 2025-05-24 ENCOUNTER — HOSPITAL ENCOUNTER (OUTPATIENT)
Dept: RADIOLOGY | Facility: HOSPITAL | Age: 50
Discharge: HOME | End: 2025-05-24
Payer: COMMERCIAL

## 2025-05-24 DIAGNOSIS — M79.89 NODULE OF SOFT TISSUE: ICD-10-CM

## 2025-05-24 PROCEDURE — 76881 US COMPL JOINT R-T W/IMG: CPT

## 2025-05-28 ENCOUNTER — PATIENT MESSAGE (OUTPATIENT)
Dept: PRIMARY CARE | Facility: CLINIC | Age: 50
End: 2025-05-28
Payer: COMMERCIAL

## 2025-05-29 DIAGNOSIS — R22.2 NODULE OF SKIN OF BACK: Primary | ICD-10-CM

## 2025-05-30 ENCOUNTER — APPOINTMENT (OUTPATIENT)
Dept: OBSTETRICS AND GYNECOLOGY | Facility: CLINIC | Age: 50
End: 2025-05-30
Payer: COMMERCIAL

## 2025-05-30 VITALS
DIASTOLIC BLOOD PRESSURE: 72 MMHG | WEIGHT: 147.6 LBS | HEIGHT: 67 IN | BODY MASS INDEX: 23.17 KG/M2 | SYSTOLIC BLOOD PRESSURE: 126 MMHG

## 2025-05-30 DIAGNOSIS — L90.0 LICHEN SCLEROSUS: ICD-10-CM

## 2025-05-30 DIAGNOSIS — Z79.890 MENOPAUSAL SYNDROME ON HORMONE REPLACEMENT THERAPY: Primary | ICD-10-CM

## 2025-05-30 DIAGNOSIS — N95.1 MENOPAUSAL SYNDROME ON HORMONE REPLACEMENT THERAPY: Primary | ICD-10-CM

## 2025-05-30 PROCEDURE — 1036F TOBACCO NON-USER: CPT | Performed by: NURSE PRACTITIONER

## 2025-05-30 PROCEDURE — 3008F BODY MASS INDEX DOCD: CPT | Performed by: NURSE PRACTITIONER

## 2025-05-30 PROCEDURE — 99213 OFFICE O/P EST LOW 20 MIN: CPT | Performed by: NURSE PRACTITIONER

## 2025-05-30 ASSESSMENT — ENCOUNTER SYMPTOMS
GASTROINTESTINAL NEGATIVE: 0
RESPIRATORY NEGATIVE: 0
ENDOCRINE NEGATIVE: 0
EYES NEGATIVE: 0
NEUROLOGICAL NEGATIVE: 0
HEMATOLOGIC/LYMPHATIC NEGATIVE: 0
MUSCULOSKELETAL NEGATIVE: 0
ALLERGIC/IMMUNOLOGIC NEGATIVE: 0
PSYCHIATRIC NEGATIVE: 0
CONSTITUTIONAL NEGATIVE: 0
CARDIOVASCULAR NEGATIVE: 0

## 2025-05-30 ASSESSMENT — PAIN SCALES - GENERAL: PAINLEVEL_OUTOF10: 0-NO PAIN

## 2025-05-30 NOTE — PROGRESS NOTES
Subjective   Patient ID: Lina Johnson is a 50 y.o. female who presents for No chief complaint on file..  HPI  Pt last seen by me 3/2025  Perimenopausal   Mood changes: yes  Sleep problems: yes  VMS yes  Brain fog/difficulty concentrating: occasional  Weight gain, on zepbound which has been effective    I suspected LS in my exam, she was asymptomatic    I prescribed:   Clobetasol  MP 100mg po  0.0375mg estradiol patch d/t elevated lipids    Today she states:  VMS resolved  Sleep: improved  Mood improved  Brain fog: rare    She is  no longer applying clobetasol d/t white patches resolving               Review of Systems    Objective   Physical Exam  Genitourinary:     Comments: No active LS, no further architectural changes or lesions        Assessment/Plan   Diagnoses and all orders for this visit:  Menopausal syndrome on hormone replacement therapy  Lichen sclerosus       Will continue with current MHT  Maintenance therapy with clobetasol twice weekly for LS discussed including the small chance of cancer in the tissue if untreated    Follow up annually or sooner with any whitening or vaginal/vulvar symptoms; pt plans to monitor for skin color changes    AMARIS Bean-CNP 05/30/25 2:47 PM

## 2025-06-07 ENCOUNTER — HOSPITAL ENCOUNTER (EMERGENCY)
Facility: HOSPITAL | Age: 50
Discharge: HOME | End: 2025-06-07
Attending: EMERGENCY MEDICINE
Payer: COMMERCIAL

## 2025-06-07 ENCOUNTER — APPOINTMENT (OUTPATIENT)
Dept: RADIOLOGY | Facility: HOSPITAL | Age: 50
End: 2025-06-07
Payer: COMMERCIAL

## 2025-06-07 DIAGNOSIS — R22.2 NODULE OF SKIN OF BACK: ICD-10-CM

## 2025-06-07 PROCEDURE — 72158 MRI LUMBAR SPINE W/O & W/DYE: CPT | Performed by: RADIOLOGY

## 2025-06-07 PROCEDURE — A9575 INJ GADOTERATE MEGLUMI 0.1ML: HCPCS | Performed by: FAMILY MEDICINE

## 2025-06-07 PROCEDURE — 72158 MRI LUMBAR SPINE W/O & W/DYE: CPT

## 2025-06-07 PROCEDURE — 4500999001 HC ED NO CHARGE: Performed by: EMERGENCY MEDICINE

## 2025-06-07 PROCEDURE — 2550000001 HC RX 255 CONTRASTS: Performed by: FAMILY MEDICINE

## 2025-06-07 RX ORDER — GADOTERATE MEGLUMINE 376.9 MG/ML
15 INJECTION INTRAVENOUS
Status: COMPLETED | OUTPATIENT
Start: 2025-06-07 | End: 2025-06-07

## 2025-06-07 RX ADMIN — GADOTERATE MEGLUMINE 13 ML: 376.9 INJECTION INTRAVENOUS at 17:23

## 2025-06-07 NOTE — ED PROVIDER NOTES
This patient was erroneously registered on the emergency department board.  I assigned myself to the patient in the computer and went to evaluate the patient.  The patient was not in the emergency department.  She was reportedly being registered for an outpatient MRI and was not ever in the emergency department.  She was not evaluated in the emergency room for that reason.     Kay Moise MD  06/07/25 1815

## 2025-06-17 ENCOUNTER — PATIENT MESSAGE (OUTPATIENT)
Dept: PRIMARY CARE | Facility: CLINIC | Age: 50
End: 2025-06-17
Payer: COMMERCIAL

## 2025-07-03 LAB
ALBUMIN SERPL-MCNC: 4.8 G/DL (ref 3.6–5.1)
ALP SERPL-CCNC: 79 U/L (ref 37–153)
ALT SERPL-CCNC: 16 U/L (ref 6–29)
ANION GAP SERPL CALCULATED.4IONS-SCNC: 7 MMOL/L (CALC) (ref 7–17)
AST SERPL-CCNC: 17 U/L (ref 10–35)
BILIRUB SERPL-MCNC: 0.5 MG/DL (ref 0.2–1.2)
BUN SERPL-MCNC: 16 MG/DL (ref 7–25)
CALCIUM SERPL-MCNC: 10 MG/DL (ref 8.6–10.4)
CHLORIDE SERPL-SCNC: 106 MMOL/L (ref 98–110)
CHOLEST SERPL-MCNC: 237 MG/DL
CHOLEST/HDLC SERPL: 2.2 (CALC)
CO2 SERPL-SCNC: 25 MMOL/L (ref 20–32)
CREAT SERPL-MCNC: 0.88 MG/DL (ref 0.5–1.03)
EGFRCR SERPLBLD CKD-EPI 2021: 79 ML/MIN/1.73M2
GLUCOSE SERPL-MCNC: 84 MG/DL (ref 65–99)
HDLC SERPL-MCNC: 109 MG/DL
LDLC SERPL CALC-MCNC: 109 MG/DL (CALC)
NONHDLC SERPL-MCNC: 128 MG/DL (CALC)
POTASSIUM SERPL-SCNC: 4.9 MMOL/L (ref 3.5–5.3)
PROT SERPL-MCNC: 7.1 G/DL (ref 6.1–8.1)
SODIUM SERPL-SCNC: 138 MMOL/L (ref 135–146)
TRIGL SERPL-MCNC: 92 MG/DL
TSH SERPL-ACNC: 3.65 MIU/L

## 2025-07-21 ENCOUNTER — TELEPHONE (OUTPATIENT)
Dept: PRIMARY CARE | Facility: CLINIC | Age: 50
End: 2025-07-21
Payer: COMMERCIAL

## 2025-07-21 NOTE — TELEPHONE ENCOUNTER
Rx Refill Request Telephone Encounter    Name:  Lina BOUCHER Victoria  :  [id number]  Medication Name:    tirzepatide, weight loss, (Zepbound) 7.5 mg/0.5 mL injection   [place] Pharm[address]   [address] [place] [address]  Date of last appointment:  25  Date of next appointment:  na